# Patient Record
Sex: MALE
[De-identification: names, ages, dates, MRNs, and addresses within clinical notes are randomized per-mention and may not be internally consistent; named-entity substitution may affect disease eponyms.]

---

## 2022-10-24 ENCOUNTER — NURSE TRIAGE (OUTPATIENT)
Dept: OTHER | Facility: CLINIC | Age: 67
End: 2022-10-24

## 2022-10-24 NOTE — TELEPHONE ENCOUNTER
Location of patient: [place]    Subjective: Caller states \"I want to know how to long to go with these symptoms before I see someone     Current Symptoms: Head congestion and post nasal drip (chronic for years) but, cough is non productive for the past 2 weeks. No difficulty breathing or chest pain. C/o fatigue. No cardiac history, no pulmonary history, no clot history. Associated Symptoms: NA    Pain Severity: 0/10; N/A; none    Temperature: 98.2     What has been tried: nothing    Recommended disposition: follow home care advice    Care advice provided, patient verbalizes understanding; denies any other questions or concerns.     Outcome:  follow home care advice    Reason for Disposition   Cough with cold symptoms (e.g., runny nose, postnasal drip, throat clearing)    Protocols used: Cough - Acute Non-Productive-ADULT-

## 2022-10-24 NOTE — TELEPHONE ENCOUNTER
Covid swab questions - asking for the procedure of covid swabbing. Reason for Disposition   General information question, no triage required and triager able to answer question    Protocols used:  Information Only Call - No Triage-ADULT-